# Patient Record
Sex: FEMALE | Race: OTHER | HISPANIC OR LATINO | URBAN - METROPOLITAN AREA
[De-identification: names, ages, dates, MRNs, and addresses within clinical notes are randomized per-mention and may not be internally consistent; named-entity substitution may affect disease eponyms.]

---

## 2022-10-20 ENCOUNTER — OUTPATIENT (OUTPATIENT)
Dept: INPATIENT UNIT | Facility: HOSPITAL | Age: 27
LOS: 1 days | Discharge: HOME | End: 2022-10-20

## 2022-10-20 ENCOUNTER — APPOINTMENT (OUTPATIENT)
Dept: ANTEPARTUM | Facility: CLINIC | Age: 27
End: 2022-10-20

## 2022-10-20 ENCOUNTER — ASOB RESULT (OUTPATIENT)
Age: 27
End: 2022-10-20

## 2022-10-20 ENCOUNTER — EMERGENCY (EMERGENCY)
Facility: HOSPITAL | Age: 27
LOS: 1 days | Discharge: STILL ADMITTED OR ADMINISTRATIVE | End: 2022-10-20
Admitting: EMERGENCY MEDICINE

## 2022-10-20 VITALS — DIASTOLIC BLOOD PRESSURE: 57 MMHG | HEART RATE: 74 BPM | SYSTOLIC BLOOD PRESSURE: 115 MMHG

## 2022-10-20 VITALS
HEART RATE: 75 BPM | OXYGEN SATURATION: 98 % | TEMPERATURE: 98 F | SYSTOLIC BLOOD PRESSURE: 104 MMHG | DIASTOLIC BLOOD PRESSURE: 66 MMHG | RESPIRATION RATE: 18 BRPM

## 2022-10-20 VITALS — HEART RATE: 71 BPM | SYSTOLIC BLOOD PRESSURE: 117 MMHG | DIASTOLIC BLOOD PRESSURE: 59 MMHG

## 2022-10-20 DIAGNOSIS — Z98.82 BREAST IMPLANT STATUS: Chronic | ICD-10-CM

## 2022-10-20 DIAGNOSIS — O26.899 OTHER SPECIFIED PREGNANCY RELATED CONDITIONS, UNSPECIFIED TRIMESTER: ICD-10-CM

## 2022-10-20 DIAGNOSIS — Z3A.00 WEEKS OF GESTATION OF PREGNANCY NOT SPECIFIED: ICD-10-CM

## 2022-10-20 LAB
ALBUMIN SERPL ELPH-MCNC: 4.5 G/DL — SIGNIFICANT CHANGE UP (ref 3.3–5)
ALP SERPL-CCNC: 69 U/L — SIGNIFICANT CHANGE UP (ref 40–120)
ALT FLD-CCNC: 14 U/L — SIGNIFICANT CHANGE UP (ref 4–33)
ANION GAP SERPL CALC-SCNC: 13 MMOL/L — SIGNIFICANT CHANGE UP (ref 7–14)
APTT BLD: 31.2 SEC — SIGNIFICANT CHANGE UP (ref 27–36.3)
AST SERPL-CCNC: 17 U/L — SIGNIFICANT CHANGE UP (ref 4–32)
BASOPHILS # BLD AUTO: 0.08 K/UL — SIGNIFICANT CHANGE UP (ref 0–0.2)
BASOPHILS NFR BLD AUTO: 0.6 % — SIGNIFICANT CHANGE UP (ref 0–2)
BILIRUB SERPL-MCNC: 0.3 MG/DL — SIGNIFICANT CHANGE UP (ref 0.2–1.2)
BLD GP AB SCN SERPL QL: NEGATIVE — SIGNIFICANT CHANGE UP
BUN SERPL-MCNC: 10 MG/DL — SIGNIFICANT CHANGE UP (ref 7–23)
CALCIUM SERPL-MCNC: 9.6 MG/DL — SIGNIFICANT CHANGE UP (ref 8.4–10.5)
CHLORIDE SERPL-SCNC: 100 MMOL/L — SIGNIFICANT CHANGE UP (ref 98–107)
CO2 SERPL-SCNC: 23 MMOL/L — SIGNIFICANT CHANGE UP (ref 22–31)
CREAT SERPL-MCNC: 0.58 MG/DL — SIGNIFICANT CHANGE UP (ref 0.5–1.3)
EGFR: 126 ML/MIN/1.73M2 — SIGNIFICANT CHANGE UP
EOSINOPHIL # BLD AUTO: 0.12 K/UL — SIGNIFICANT CHANGE UP (ref 0–0.5)
EOSINOPHIL NFR BLD AUTO: 1 % — SIGNIFICANT CHANGE UP (ref 0–6)
FIBRINOGEN PPP-MCNC: 736 MG/DL — HIGH (ref 330–520)
GLUCOSE SERPL-MCNC: 75 MG/DL — SIGNIFICANT CHANGE UP (ref 70–99)
HCT VFR BLD CALC: 40.4 % — SIGNIFICANT CHANGE UP (ref 34.5–45)
HGB BLD-MCNC: 13.7 G/DL — SIGNIFICANT CHANGE UP (ref 11.5–15.5)
IANC: 8.79 K/UL — HIGH (ref 1.8–7.4)
IMM GRANULOCYTES NFR BLD AUTO: 2.3 % — HIGH (ref 0–0.9)
INR BLD: 0.98 RATIO — SIGNIFICANT CHANGE UP (ref 0.88–1.16)
LYMPHOCYTES # BLD AUTO: 2.73 K/UL — SIGNIFICANT CHANGE UP (ref 1–3.3)
LYMPHOCYTES # BLD AUTO: 21.7 % — SIGNIFICANT CHANGE UP (ref 13–44)
MCHC RBC-ENTMCNC: 31.8 PG — SIGNIFICANT CHANGE UP (ref 27–34)
MCHC RBC-ENTMCNC: 33.9 GM/DL — SIGNIFICANT CHANGE UP (ref 32–36)
MCV RBC AUTO: 93.7 FL — SIGNIFICANT CHANGE UP (ref 80–100)
MONOCYTES # BLD AUTO: 0.56 K/UL — SIGNIFICANT CHANGE UP (ref 0–0.9)
MONOCYTES NFR BLD AUTO: 4.5 % — SIGNIFICANT CHANGE UP (ref 2–14)
NEUTROPHILS # BLD AUTO: 8.79 K/UL — HIGH (ref 1.8–7.4)
NEUTROPHILS NFR BLD AUTO: 69.9 % — SIGNIFICANT CHANGE UP (ref 43–77)
NRBC # BLD: 0 /100 WBCS — SIGNIFICANT CHANGE UP (ref 0–0)
NRBC # FLD: 0 K/UL — SIGNIFICANT CHANGE UP (ref 0–0)
PLATELET # BLD AUTO: 264 K/UL — SIGNIFICANT CHANGE UP (ref 150–400)
POTASSIUM SERPL-MCNC: 3.4 MMOL/L — LOW (ref 3.5–5.3)
POTASSIUM SERPL-SCNC: 3.4 MMOL/L — LOW (ref 3.5–5.3)
PROT SERPL-MCNC: 7.8 G/DL — SIGNIFICANT CHANGE UP (ref 6–8.3)
PROTHROM AB SERPL-ACNC: 11.4 SEC — SIGNIFICANT CHANGE UP (ref 10.5–13.4)
RBC # BLD: 4.31 M/UL — SIGNIFICANT CHANGE UP (ref 3.8–5.2)
RBC # FLD: 13.1 % — SIGNIFICANT CHANGE UP (ref 10.3–14.5)
RH IG SCN BLD-IMP: NEGATIVE — SIGNIFICANT CHANGE UP
RH IG SCN BLD-IMP: NEGATIVE — SIGNIFICANT CHANGE UP
SODIUM SERPL-SCNC: 136 MMOL/L — SIGNIFICANT CHANGE UP (ref 135–145)
WBC # BLD: 12.57 K/UL — HIGH (ref 3.8–10.5)
WBC # FLD AUTO: 12.57 K/UL — HIGH (ref 3.8–10.5)

## 2022-10-20 PROCEDURE — 76805 OB US >/= 14 WKS SNGL FETUS: CPT | Mod: 26

## 2022-10-20 PROCEDURE — 76817 TRANSVAGINAL US OBSTETRIC: CPT | Mod: 26

## 2022-10-20 PROCEDURE — L9996: CPT

## 2022-10-20 RX ORDER — FERROUS SULFATE 325(65) MG
325 TABLET ORAL DAILY
Refills: 0 | Status: DISCONTINUED | OUTPATIENT
Start: 2022-10-20 | End: 2022-10-21

## 2022-10-20 RX ORDER — SODIUM CHLORIDE 9 MG/ML
1000 INJECTION, SOLUTION INTRAVENOUS ONCE
Refills: 0 | Status: COMPLETED | OUTPATIENT
Start: 2022-10-20 | End: 2022-10-20

## 2022-10-20 RX ORDER — FOLIC ACID 0.8 MG
1 TABLET ORAL DAILY
Refills: 0 | Status: DISCONTINUED | OUTPATIENT
Start: 2022-10-20 | End: 2022-10-21

## 2022-10-20 RX ADMIN — Medication 12 MILLIGRAM(S): at 20:17

## 2022-10-20 RX ADMIN — SODIUM CHLORIDE 1000 MILLILITER(S): 9 INJECTION, SOLUTION INTRAVENOUS at 17:25

## 2022-10-20 NOTE — OB RN TRIAGE NOTE - NS_ARRIVALINFOCOMMENT_OBGYN_ALL_OB_FT
Patient disembarked from airplane and was transported from [facility] to [facility] via ambulance

## 2022-10-20 NOTE — OB PROVIDER TRIAGE NOTE - NSOBPROVIDERNOTE_OBGYN_ALL_OB_FT
27 y.o. Kimmie speaking ID# [id number]  of unknown gestational age (reports PEEWEE from sono in [place] -) presenting to triage with vaginal bleeding x 2 days. Due to uncertainty of LMP and EDC from patient's history, best estimate from ATU sono is 23w4d (PEEWEE 23). Patient with minimal bleeding on exam but noted to have variables on tracing. ATU scan also significant for CL of 1.12 with funneling (overall dynamic appearing cervix with other length of 3.58)    #Vaginal bleeding, second trimester  - Indomethacin for tocolysis  - BMZ for FLM  - Initial prolonged monitoring, may switch to NST BID after    #Fetal wellbeing  - BMZ/Monitoring as above    #Maternal wellbeing  - Regular diet  - SCDs for DVT ppx, transition to HSQ if bleeding continued to stop   - PNV/Iron/Colace/Folic acid      d/w Dr. Georges REILLY attending + Dr. Maldonado service attending  Jonathan Bravo PGY-3 27 y.o. Kimmie speaking ID# [id number]  of unknown gestational age (reports PEEWEE from sono in [place] -) presenting to triage with vaginal bleeding x 2 days. Due to uncertainty of LMP and EDC from patient's history, best estimate from ATU sono is 23w4d (PEEWEE 23). Patient with minimal bleeding on exam but noted to have variables on tracing. ATU scan also significant for CL of 1.12 with funneling (overall dynamic appearing cervix with other length of 3.58)    #Vaginal bleeding, second trimester  - Indomethacin for tocolysis  - BMZ for FLM  - Initial prolonged monitoring, may switch to NST BID after    #Fetal wellbeing  - BMZ/Monitoring as above    #Maternal wellbeing  - Regular diet  - SCDs for DVT ppx, transition to HSQ if bleeding continued to stop   - PNV/Iron/Colace/Folic acid      d/w Dr. Su M attending + Dr. Maldonado service attending  Jonathan Bravo PGY-3      Delayed charting /2 patient care  [facility]  [id number]    Patient seen at bedside to explain recommendations to receive Rhogam and BMZ per MFM. Patient counseled extensively about remaining in house for continued monitoring. Patient called her doctor in [place] who advised patient to leave the hospital and return in 24 hours for her second dose of BMZ. Strict return precautions counseled. Patient expressed understanding.     Reviewed on MFM safety rounds with Dr. Flannery  d/w Dr. Hirschberg Miriam Tarrash PGY3    [facility]  #[id number]    AMA paperwork signed by Ayesha Vasquez PGY4 and all questions answered to patient satisfaction.

## 2022-10-20 NOTE — CHART NOTE - NSCHARTNOTEFT_GEN_A_CORE
R4 OB chart note    Pt seen at bedside to sign AMA paperwork.   Reviewed concerns regarding FHR in setting of episode of VB.   Unable to guarantee fetal status or outcomes without continued monitoring while inpatient.   Reviewed that patient if leaving AMA and return with increased vaginal bleeding, fetus have concerning status or be nonviable. R4 OB chart note    [place]ian  # [id number] ([place])    Pt seen at bedside to sign AMA paperwork.   Reviewed concerns regarding FHR in setting of episode of VB.   Unable to guarantee fetal status or outcomes without continued monitoring while inpatient.   Reviewed risks of leaving AMA and returning, including:  - no longer viable fetus  - concerning fetal status requiring urgent delivery which can increase maternal morbidity.     Pt verbalized understanding of risks to fetus and self.   AMA paperwork signed and left on chart.  Pt verbalized plan to return 10/21 @ [id number] for evaluation and next dose of BMZ.  Return precautions reviewed including VB, LOF, CTX, decreased FM.     Pedro R4

## 2022-10-20 NOTE — OB PROVIDER TRIAGE NOTE - NSHPPHYSICALEXAM_GEN_ALL_CORE
Vital Signs Last 24 Hrs  T(C): 36.9 (20 Oct 2022 14:15), Max: 36.9 (20 Oct 2022 14:15)  T(F): 98.4 (20 Oct 2022 14:15), Max: 98.4 (20 Oct 2022 14:15)  HR: 75 (20 Oct 2022 18:01) (71 - 75)  BP: 109/58 (20 Oct 2022 18:01) (104/66 - 117/59)  BP(mean): --  RR: 14 (20 Oct 2022 14:15) (14 - 18)  SpO2: 98% (20 Oct 2022 12:42) (98% - 98%)    General: well appearing, NAD   SSE: + dark blood in vaginal vault, approximately 5-10cc. No active bleeding. Cervix appears closed   TAUS: vtx presentation, MARSHA 15.62, posterior placenta   TVUS: CL 3.57cm     Official ATU scan: Vital Signs Last 24 Hrs  T(C): 36.9 (20 Oct 2022 14:15), Max: 36.9 (20 Oct 2022 14:15)  T(F): 98.4 (20 Oct 2022 14:15), Max: 98.4 (20 Oct 2022 14:15)  HR: 75 (20 Oct 2022 18:01) (71 - 75)  BP: 109/58 (20 Oct 2022 18:01) (104/66 - 117/59)  BP(mean): --  RR: 14 (20 Oct 2022 14:15) (14 - 18)  SpO2: 98% (20 Oct 2022 12:42) (98% - 98%)    General: well appearing, NAD   SSE: + dark blood in vaginal vault, approximately 5-10cc. No active bleeding. Cervix appears closed   TAUS: vtx presentation, MARSHA 15.62, posterior placenta   TVUS: CL 3.57cm   FHR: Baseline 140, moderate variability, + accelerations, intermittent variable decelerations noted   [place]: Two contractions noted in 30 min span     Official ATU scan:  Vtx, posterior, EFW 680g (28%), HC<1%, MVP 6.3, CL 1.19 with funneling and noted with abdominal pressure, PEEWEE 2/12/23 (EGA from fetal biometry 23w4d)

## 2022-10-20 NOTE — OB PROVIDER TRIAGE NOTE - NS_OBGYNHISTORY_OBGYN_ALL_OB_FT
OBHx:   - Per patient had PPROM @30w with subsequent 31w delivery. However, infant did not require NICU stay -> 2300g   - sAB x 1   Gyn: denies any ovarian cysts, fibroids, abnl pap smears, STI s

## 2022-10-20 NOTE — OB PROVIDER TRIAGE NOTE - HISTORY OF PRESENT ILLNESS
R3 Provider Triage Note     27 y.o. Kimmie speaking ID# [id number]  EGA 24w4d to 26w6d (reports PEEWEE from [facility] in [place] -) presenting to triage with vaginal bleeding x 2 days. Patient recently began traveling from [facility] to [place] this past week. She reports that she had sex with partner approximately two days prior and had some light bleeding the morning after. Following this, bleeding began to get intermittently more heavy. She was about to take the flight to [place] this AM when she passed walnut sized clot. Per patient she told  who instructed her to follow up with a provider in [place]. She also contacted her OB in [place] who told her she needed an exam and transvaginal ultrasound. + FM, denies contractions, LOF.     OBHx:   - Per patient had PPROM @30w with subsequent 31w delivery. However, infant did not require NICU stay -> 2300g   - sAB x 1   Gyn: denies any ovarian cysts, fibroids, abnl pap smears, STI s   PMHx: denies   Meds: PNV   SHx: breast implants   Psych: denies any anxiety or depression   Social: reports marijuana use + alcohol use prior to pregnancy. When asked to clarify patient reports it has been > 1 year   All: NKDA  R3 Provider Triage Note     27 y.o. Kimmie speaking ID# [id number]  of unknown gestational age (reports PEEWEE from sono in [place] -) presenting to triage with vaginal bleeding x 2 days. Patient recently began traveling from [facility] to [place] this past week. She reports that she had sex with partner approximately two days prior and had some light bleeding the morning after. Following this, bleeding began to get intermittently more heavy. She was about to take the flight to [place] this AM when she passed walnut sized clot. Per patient she told  who instructed her to follow up with a provider in [place]. She also contacted her OB in [place] who told her she needed an exam and transvaginal ultrasound. + FM, denies contractions, LOF.     OBHx:   - Per patient had PPROM @30w with subsequent 31w delivery. However, infant did not require NICU stay -> 2300g   - sAB x 1   Gyn: denies any ovarian cysts, fibroids, abnl pap smears, STI s   PMHx: denies   Meds: PNV   SHx: breast implants   Psych: denies any anxiety or depression   Social: reports marijuana use + alcohol use prior to pregnancy. When asked to clarify patient reports it has been > 1 year   All: NKDA

## 2022-10-20 NOTE — OB RN TRIAGE NOTE - NSLDARRIVAL_OBGYN_ALL_OB_END_DATE
Received verbal and BSSR from off going nurse, [address]. Patient is resting quietly in recliner. Denies needs at this time. Will continue to follow. 20-Oct-2022 21:07

## 2022-10-20 NOTE — OB RN TRIAGE NOTE - FALL HARM RISK - UNIVERSAL INTERVENTIONS
Bed in lowest position, wheels locked, appropriate side rails in place/Call bell, personal items and telephone in reach/Instruct patient to call for assistance before getting out of bed or chair/Non-slip footwear when patient is out of bed/[place] to call system/Physically safe environment - no spills, clutter or unnecessary equipment/Purposeful Proactive Rounding/Room/bathroom lighting operational, light cord in reach

## 2022-10-20 NOTE — ED ADULT TRIAGE NOTE - CHIEF COMPLAINT QUOTE
Patient brought to ER by EMS from airport . Pt is 25 weeks pregnant with vaginal bleeding. 2nd baby.

## 2022-10-21 ENCOUNTER — OUTPATIENT (OUTPATIENT)
Dept: INPATIENT UNIT | Facility: HOSPITAL | Age: 27
LOS: 1 days | Discharge: HOME | End: 2022-10-21

## 2022-10-21 VITALS — HEART RATE: 90 BPM | SYSTOLIC BLOOD PRESSURE: 119 MMHG | DIASTOLIC BLOOD PRESSURE: 68 MMHG

## 2022-10-21 VITALS
RESPIRATION RATE: 15 BRPM | TEMPERATURE: 98 F | HEART RATE: 83 BPM | SYSTOLIC BLOOD PRESSURE: 119 MMHG | DIASTOLIC BLOOD PRESSURE: 65 MMHG

## 2022-10-21 DIAGNOSIS — Z98.82 BREAST IMPLANT STATUS: Chronic | ICD-10-CM

## 2022-10-21 DIAGNOSIS — Z3A.00 WEEKS OF GESTATION OF PREGNANCY NOT SPECIFIED: ICD-10-CM

## 2022-10-21 DIAGNOSIS — O26.899 OTHER SPECIFIED PREGNANCY RELATED CONDITIONS, UNSPECIFIED TRIMESTER: ICD-10-CM

## 2022-10-21 RX ADMIN — Medication 12 MILLIGRAM(S): at 23:06

## 2022-10-21 NOTE — OB PROVIDER TRIAGE NOTE - HISTORY OF PRESENT ILLNESS
[place]vad[place]  #[id number]    27 y.o. Portugese speaking  approximately 23w5d based on US 10/20 (reports PEEWEE from [facility] in [place] -) presenting to triage for second dose of betamethasone. Patient presented initially yesterday with vaginal bleeding x 2 days after intercourse. She received Rhogam and Betamethasone 10/20 and was counseled to remain in the hospital for close monitoring, however, signed out AMA after discussion with her OBGYN in [place]. Patient is without complaints today. Denies ctx, LOF, VB and dec FM. Denies HA, SOB, CP, RUQ/epigastric pain, b/l swelling LE and UE, or vision changes.     Of note, patient and partner report that they will only stay here 20 mins and will not allow for blood work, urine or other continued workup. They state that they will leave then regardless of the NST or other recommendations.     OBHx:   - Per patient had PPROM @30w with subsequent 31w delivery. However, infant did not require NICU stay -> 2300g   - sAB x 1   Gyn: denies any ovarian cysts, fibroids, abnl pap smears, STI s   PMHx: denies   Meds: PNV   SHx: breast implants   Psych: denies any anxiety or depression   Social: reports marijuana use + alcohol use prior to pregnancy. When asked to clarify patient reports it has been > 1 year   All: NKDA

## 2022-10-21 NOTE — OB PROVIDER TRIAGE NOTE - NSOBPROVIDERNOTE_OBGYN_ALL_OB_FT
27 y.o. Portugese speaking  approximately 23w5d based on US 10/20 (reports PEEWEE from sono in [place] -) presenting to triage for second dose of betamethasone. Patient received BMZ #1 10/20 and Rhogam for vaginal spotting after intercourse. Patient is asymptomatic and stable at this time.     #Vaginal bleeding, second trimester  - BMZ for FLM (10/20-)  - NST    #Fetal wellbeing  - BMZ/Monitoring as above    #Maternal wellbeing  - PNV     d/w Dr. Adelaide Doss PGY3 27 y.o. Portugese speaking  approximately 23w5d based on US 10/20 (reports PEEWEE from sono in [place] -) presenting to triage for second dose of betamethasone. Patient received BMZ #1 10/20 and Rhogam for vaginal spotting after intercourse. Patient is asymptomatic and stable at this time.     #Vaginal bleeding, second trimester  - BMZ for FLM (10/20-)  - Rh negative, s/p Rhogam (10/20)  - NST    #Fetal wellbeing  - BMZ/Monitoring as above    #Maternal wellbeing  - PNV     d/w Dr. Adelaide Doss PGY3 27 y.o. Portugese speaking  approximately 23w5d based on US 10/20 (reports PEEWEE from sono in [place] -) presenting to triage for second dose of betamethasone. Patient received BMZ #1 10/20 and Rhogam for vaginal spotting after intercourse. Patient is asymptomatic and stable at this time.     #Vaginal bleeding, second trimester  - BMZ for FLM (10/20-)  - Rh negative, s/p Rhogam (10/20)  - NST    #Fetal wellbeing  - BMZ/Monitoring as above    #Maternal wellbeing  - PNV     d/w Dr. Adelaide Doss PGY3    NST reviewed with Dr. Vasquez PGY3 and Dr. Bryson and reactive tracing, quiet toco. Strict return precautions counseled. Patient expressed understanding and all questions answered to patient and  satisfaction.     d/w Dr. Adelaide Doss PGY3

## 2022-10-21 NOTE — OB PROVIDER TRIAGE NOTE - NSHPPHYSICALEXAM_GEN_ALL_CORE
Vital Signs Last 24 Hrs  T(C): 36.8 (21 Oct 2022 22:42), Max: 36.8 (21 Oct 2022 22:42)  T(F): 98.24 (21 Oct 2022 22:42), Max: 98.24 (21 Oct 2022 22:42)  HR: 83 (21 Oct 2022 22:42) (83 - 83)  BP: 119/65 (21 Oct 2022 22:42) (119/65 - 119/65)  BP(mean): --  RR: --  SpO2: --    Gen NAD  CV Regular   Pulm comfortable on RA  Abd soft nontender, gravid  SVE Deferred   SSE Deferred   Ext nontender b/l   FHT: Baseline 145, mod, +accel, -decel  [place] quiet

## 2022-10-21 NOTE — OB RN TRIAGE NOTE - NSNURSINGINSTR_OBGYN_ALL_OB_FT
Patient presented in Triage for follow up to receive 2nd dose of betamethasone. Patient evaluated. Medication administered. Patient discharged home. Discharge instructions provided by MD Doss.

## 2022-10-21 NOTE — OB PROVIDER TRIAGE NOTE - NS_OBGYNHISTORY_OBGYN_ALL_OB_FT
OBHx:   - Per patient had PPROM @30w with subsequent 31w delivery. However, infant did not require NICU stay -> 2300g   - sAB x 1

## 2022-10-22 PROBLEM — Z78.9 OTHER SPECIFIED HEALTH STATUS: Chronic | Status: ACTIVE | Noted: 2022-10-20

## 2022-10-24 PROBLEM — Z00.00 ENCOUNTER FOR PREVENTIVE HEALTH EXAMINATION: Status: ACTIVE | Noted: 2022-10-24

## 2024-03-01 NOTE — OB RN TRIAGE NOTE - NSLDARRIVAL_OBGYN_ALL_OB_START_DATE
H&P update    Pt was seen and examined bed side, no changes in history and examination since last full H&P was done on 2/2   21-Oct-2022 21:57

## 2024-07-31 NOTE — OB PROVIDER TRIAGE NOTE - NS_FHRDECEL_OBGYN_ALL_OB
Patient noted to be acutely confused this morning  Likely multifactorial table to late afternoon early evening anesthesia and lack of sleep overnight    However, patient with history of stroke and given recent surgical procedure, some concern for possible CVA versus TIA  Repeat labs pending; consult to geriatrics  Stat CT head obtained, no acute events noted  Continue DAPT therapy for now  Trop highly elevated, have discussed with cardiology, correct reversible causes of possible demand ischemia   EKG with no significant changes since yesterday-no STEMI  Reassess in a.m., discussed with geriatrics    Workup of altered mental status:  Alcohol - neg etoh intake  Epilepsy/electrolytes: no history of seizure disorder; labs stable  Insulin - blood glucose WNL  Opiates and oxygen: SpO2-96% on room air; opiates used sparingly  Uremia - creatinine elevated, but stable; will obtain ammonia levels given profound increase in LFTs  Trauma/temperature - atraumatic; afebrile; not hypothermic  Infection -source control obtained surgically yesterday, on antibiotics now  Poisons/psychogenic - no hx of acute poisoning; medications reviewed; unclear if psychogenic  Stroke/seizures: CT head negative for acute bleed or ischemic event; no hx of seizure activity     No Decelerations